# Patient Record
Sex: FEMALE | Race: BLACK OR AFRICAN AMERICAN | HISPANIC OR LATINO | URBAN - METROPOLITAN AREA
[De-identification: names, ages, dates, MRNs, and addresses within clinical notes are randomized per-mention and may not be internally consistent; named-entity substitution may affect disease eponyms.]

---

## 2022-02-01 ENCOUNTER — TELEPHONE (OUTPATIENT)
Dept: OTHER | Facility: OTHER | Age: 22
End: 2022-02-01

## 2022-02-01 NOTE — TELEPHONE ENCOUNTER
Patient called and is asking if the office can give her a call to schedule an new patient appointment

## 2022-02-14 ENCOUNTER — OFFICE VISIT (OUTPATIENT)
Dept: FAMILY MEDICINE CLINIC | Facility: CLINIC | Age: 22
End: 2022-02-14

## 2022-02-14 VITALS
DIASTOLIC BLOOD PRESSURE: 64 MMHG | HEIGHT: 66 IN | OXYGEN SATURATION: 99 % | SYSTOLIC BLOOD PRESSURE: 106 MMHG | BODY MASS INDEX: 21.63 KG/M2 | TEMPERATURE: 97.7 F | RESPIRATION RATE: 16 BRPM | HEART RATE: 92 BPM | WEIGHT: 134.6 LBS

## 2022-02-14 DIAGNOSIS — R22.1 NECK SWELLING: Primary | ICD-10-CM

## 2022-02-14 DIAGNOSIS — Z23 ENCOUNTER FOR IMMUNIZATION: ICD-10-CM

## 2022-02-14 PROCEDURE — 90471 IMMUNIZATION ADMIN: CPT | Performed by: FAMILY MEDICINE

## 2022-02-14 PROCEDURE — 90686 IIV4 VACC NO PRSV 0.5 ML IM: CPT | Performed by: FAMILY MEDICINE

## 2022-02-14 PROCEDURE — 99203 OFFICE O/P NEW LOW 30 MIN: CPT | Performed by: FAMILY MEDICINE

## 2022-02-14 RX ORDER — DESOGESTREL AND ETHINYL ESTRADIOL 0.15-0.03
1 KIT ORAL DAILY
COMMUNITY
Start: 2021-10-06 | End: 2022-10-06

## 2022-02-14 NOTE — ASSESSMENT & PLAN NOTE
-symptoms of neck swelling and left side neck discomfort noticed progressing during the last 7 months  -associated with: hair loss, fatigue, hot flashes  -followed with ENT at [facility]--> laryngoscopy workup remarkable for nasal congestion  -physical exam remarkable for midline neck swelling with some enlargement of thyroid palpated, left side of neck slight more swollen when compared to right but difficult to determine if is more muscle related  -will send thyroid U/s to check for thyroid nodules  -will check lab work: TSH, Free T4, anti TPO antibodies to rule out thyroid disease  -will check U/S neck and soft tissue to rule out any mass vs lymphadenopathy  -upon results will follow up and determine if further workup is needed

## 2022-02-14 NOTE — ASSESSMENT & PLAN NOTE
-due for influenza vaccine, provided today  -discussed with patient that recommendations to wait at least 2 weeks from receiving Flu vaccine to receive COVID vaccine booster

## 2022-02-14 NOTE — PROGRESS NOTES
Assessment/Plan:    Encounter for immunization  -due for influenza vaccine, provided today  -discussed with patient that recommendations to wait at least 2 weeks from receiving Flu vaccine to receive COVID vaccine booster    Neck swelling  -symptoms of neck swelling and left side neck discomfort noticed progressing during the last 7 months  -associated with: hair loss, fatigue, hot flashes  -followed with ENT at [facility]--> laryngoscopy workup remarkable for nasal congestion  -physical exam remarkable for midline neck swelling with some enlargement of thyroid palpated, left side of neck slight more swollen when compared to right but difficult to determine if is more muscle related  -will send thyroid U/s to check for thyroid nodules  -will check lab work: TSH, Free T4, anti TPO antibodies to rule out thyroid disease  -will check U/S neck and soft tissue to rule out any mass vs lymphadenopathy  -upon results will follow up and determine if further workup is needed         Diagnoses and all orders for this visit:    Neck swelling  -     TSH, 3rd generation; Future  -     T4, free; Future  -     US thyroid; Future  -     US head neck soft tissue; Future  -     Anti-TPO Ab; Future    Encounter for immunization  -     influenza vaccine, quadrivalent, 0 5 mL, preservative-free, for adult and pediatric patients 6 mos+ (AFLURIA, FLUARIX, FLULAVAL, FLUZONE)    Other orders  -     desogestrel-ethinyl estradiol (APRI) 0 15-30 MG-MCG per tablet; Take 1 tablet by mouth daily  -     BIOTIN PO; Take 500 mg by mouth          Subjective:      Patient ID: Sandy Kirkpatrick is a 22 y o  female  Case of 21 y/o female who came today to establish care  Patient indicates to have noticed neck discomfort and some difficulty breathing through the nose, specially in the morning  Symptoms have been progressing for the past 7 months approximately    She has been evaluated by ENT in [facility]--> laryngoscopy unremarkable except for nasal congestion  She reports hair loss, fatigue and hot flashes in the morning  Noticed some weight gain after the depo shots  Also some headaches around the time that started hormone contraception, no discomfort with light, frontal headaches location with no pulsating or throbbing  Relieves with ibuprofen PRN  Denies palpitations  OBGYN:  -following at [facility] THE [facility]  -trial with Depo Shot--> stopped due to side effects of headaches and hair loss  -switched to OCP's--> takes only when traveling to see partner, has been off OCP's since about 3 weeks and has had regular periods since  -regular periods, no excess bleeding reported  -A0    Surgeries: scoliosis  Fmhx: No DM, thyroid or HTN  Allergies: none  Meds: ibuprofen PRN, OCP's      The following portions of the patient's history were reviewed and updated as appropriate: allergies, current medications, past family history, past medical history, past social history, past surgical history and problem list     Review of Systems   Constitutional: Positive for fatigue  Negative for fever  HENT:        Neck swelling   Respiratory: Negative for cough, shortness of breath and wheezing  Cardiovascular: Negative for chest pain, palpitations and leg swelling  Gastrointestinal: Negative for abdominal pain  Endocrine: Positive for heat intolerance  Negative for cold intolerance  Skin: Negative  Neurological: Negative for headaches  Psychiatric/Behavioral: The patient is not nervous/anxious  Objective:      /64 (BP Location: Left arm, Patient Position: Sitting, Cuff Size: Standard)   Pulse 92   Temp 97 7 °F (36 5 °C) (Temporal)   Resp 16   Ht 5' 5 5" (1 664 m)   Wt 61 1 kg (134 lb 9 6 oz)   LMP 2022 (Within Days)   SpO2 99%   BMI 22 06 kg/m²          Physical Exam  Vitals reviewed  Constitutional:       General: She is not in acute distress  Appearance: Normal appearance  She is not ill-appearing, toxic-appearing or diaphoretic  Eyes:      Extraocular Movements: Extraocular movements intact  Neck:      Vascular: No carotid bruit  Comments: Palpable thyroid with midline neck swelling bilaterally  Left side slight more enlarged when compared to right side, unclear if related to enlarged neck muscle  variant  Abdominal:      General: Abdomen is flat  Bowel sounds are normal       Palpations: Abdomen is soft  Musculoskeletal:      Cervical back: Normal range of motion  No tenderness  Lymphadenopathy:      Cervical: No cervical adenopathy  Skin:     General: Skin is warm and dry  Coloration: Skin is not jaundiced or pale  Findings: No bruising, erythema, lesion or rash  Comments: Dry hands   Neurological:      General: No focal deficit present  Mental Status: She is alert and oriented to person, place, and time  Mental status is at baseline     Psychiatric:         Mood and Affect: Mood normal

## 2022-02-15 ENCOUNTER — HOSPITAL ENCOUNTER (OUTPATIENT)
Dept: ULTRASOUND IMAGING | Facility: HOSPITAL | Age: 22
Discharge: HOME | End: 2022-02-15
Payer: COMMERCIAL

## 2022-02-15 DIAGNOSIS — R22.1 NECK SWELLING: ICD-10-CM

## 2022-02-15 DIAGNOSIS — E04.1 THYROID NODULE: ICD-10-CM

## 2022-02-15 PROCEDURE — 76536 US EXAM OF HEAD AND NECK: CPT

## 2022-03-31 ENCOUNTER — APPOINTMENT (OUTPATIENT)
Dept: LAB | Facility: CLINIC | Age: 22
End: 2022-03-31
Payer: COMMERCIAL

## 2022-03-31 ENCOUNTER — OFFICE VISIT (OUTPATIENT)
Dept: FAMILY MEDICINE CLINIC | Facility: CLINIC | Age: 22
End: 2022-03-31

## 2022-03-31 VITALS
DIASTOLIC BLOOD PRESSURE: 62 MMHG | SYSTOLIC BLOOD PRESSURE: 100 MMHG | HEART RATE: 79 BPM | RESPIRATION RATE: 16 BRPM | WEIGHT: 132.2 LBS | TEMPERATURE: 98 F | HEIGHT: 66 IN | OXYGEN SATURATION: 99 % | BODY MASS INDEX: 21.24 KG/M2

## 2022-03-31 DIAGNOSIS — L70.8 OTHER ACNE: Primary | ICD-10-CM

## 2022-03-31 DIAGNOSIS — R23.8 BRUISES EASILY: ICD-10-CM

## 2022-03-31 DIAGNOSIS — R22.1 NECK SWELLING: ICD-10-CM

## 2022-03-31 DIAGNOSIS — Z30.49 ENCOUNTER FOR SURVEILLANCE OF OTHER CONTRACEPTIVE: ICD-10-CM

## 2022-03-31 PROBLEM — R23.3 BRUISES EASILY: Status: ACTIVE | Noted: 2022-03-31

## 2022-03-31 PROBLEM — Z30.40 CONTRACEPTIVE SURVEILLANCE: Status: ACTIVE | Noted: 2022-03-31

## 2022-03-31 LAB
BASOPHILS # BLD AUTO: 0.04 THOUSANDS/ΜL (ref 0–0.1)
BASOPHILS NFR BLD AUTO: 1 % (ref 0–1)
EOSINOPHIL # BLD AUTO: 0.04 THOUSAND/ΜL (ref 0–0.61)
EOSINOPHIL NFR BLD AUTO: 1 % (ref 0–6)
ERYTHROCYTE [DISTWIDTH] IN BLOOD BY AUTOMATED COUNT: 13.3 % (ref 11.6–15.1)
HCT VFR BLD AUTO: 38.1 % (ref 34.8–46.1)
HGB BLD-MCNC: 12.2 G/DL (ref 11.5–15.4)
IMM GRANULOCYTES # BLD AUTO: 0.01 THOUSAND/UL (ref 0–0.2)
IMM GRANULOCYTES NFR BLD AUTO: 0 % (ref 0–2)
LYMPHOCYTES # BLD AUTO: 1.37 THOUSANDS/ΜL (ref 0.6–4.47)
LYMPHOCYTES NFR BLD AUTO: 38 % (ref 14–44)
MCH RBC QN AUTO: 28.6 PG (ref 26.8–34.3)
MCHC RBC AUTO-ENTMCNC: 32 G/DL (ref 31.4–37.4)
MCV RBC AUTO: 89 FL (ref 82–98)
MONOCYTES # BLD AUTO: 0.38 THOUSAND/ΜL (ref 0.17–1.22)
MONOCYTES NFR BLD AUTO: 11 % (ref 4–12)
NEUTROPHILS # BLD AUTO: 1.74 THOUSANDS/ΜL (ref 1.85–7.62)
NEUTS SEG NFR BLD AUTO: 49 % (ref 43–75)
NRBC BLD AUTO-RTO: 0 /100 WBCS
PLATELET # BLD AUTO: 319 THOUSANDS/UL (ref 149–390)
PMV BLD AUTO: 11.4 FL (ref 8.9–12.7)
RBC # BLD AUTO: 4.26 MILLION/UL (ref 3.81–5.12)
T4 FREE SERPL-MCNC: 0.99 NG/DL (ref 0.78–1.33)
TSH SERPL DL<=0.05 MIU/L-ACNC: 1.38 UIU/ML (ref 0.46–3.98)
WBC # BLD AUTO: 3.58 THOUSAND/UL (ref 4.31–10.16)

## 2022-03-31 PROCEDURE — 86376 MICROSOMAL ANTIBODY EACH: CPT

## 2022-03-31 PROCEDURE — 99213 OFFICE O/P EST LOW 20 MIN: CPT | Performed by: FAMILY MEDICINE

## 2022-03-31 PROCEDURE — 85025 COMPLETE CBC W/AUTO DIFF WBC: CPT

## 2022-03-31 PROCEDURE — 84439 ASSAY OF FREE THYROXINE: CPT

## 2022-03-31 PROCEDURE — 84443 ASSAY THYROID STIM HORMONE: CPT

## 2022-03-31 PROCEDURE — 36415 COLL VENOUS BLD VENIPUNCTURE: CPT

## 2022-03-31 NOTE — ASSESSMENT & PLAN NOTE
-thyroid ultrasound overall normal, no nodules  -physical exam: no palpable thyroid nodules or lymphadenopathy noted  -reports frequent hair loss; associated at times with concurrent use of OCP's  -due for lab work: TSH/ FT4 for assess

## 2022-03-31 NOTE — PROGRESS NOTES
Assessment/Plan:    Neck swelling  -thyroid ultrasound overall normal, no nodules  -physical exam: no palpable thyroid nodules or lymphadenopathy noted  -reports frequent hair loss; associated at times with concurrent use of OCP's  -due for lab work: TSH/ FT4 for assess    Other acne  -on/off changes associated with use of OCP's  -no prior hx of acne in the past  -physical exam: small comedones in area of cheeks bilaterally, no erythema  -recommendations to start: benzoyl peroxide daily; wash face prior to apply  -re-evaluate in next visit, if persists or worsens consider adding topical antibiotic to regimen  -use moisturizer with sunscreen as well    Bruises easily  -reports symptoms of bruising on/off since use of OCP's  -no bruising changes noted today  -check CBC    Contraceptive surveillance  -follows with OBGYN at [address] [place] [address] [address]  -prior use of Depo Porvera injection; now switched to oral OCP's  -sexually active in long distance relationship; uses OCP's for short period of time  -noticed side effects of : acne, hair loss  -followed with dermatology: trial of rogaine for hair loss not worked as per patient  -discussed with patient that the hormone composition of Depo Provera injection and OCP's varies and hormonal variations varies from patient to patient  -will check labs for thyroid, CBC  -recommendations to follow up with OBGYN to explain about side effects to see which other contraceptive options could be recommended  -ED precautions if sudden onset chest pain, SOB, leg swelling while using OCP' s due to potential side effects of DVT       Diagnoses and all orders for this visit:    Other acne  -     benzoyl peroxide 5 % gel; Apply topically daily    Bruises easily  -     CBC and differential; Future    Neck swelling    Encounter for surveillance of other contraceptive          Subjective:      Patient ID: Sandy Kirkpatrick is a 22 y o  female  Case of 21 y/o female who came today for follow up   She indicates that had thyroid ultrasound done, has not noticed recently the 'lump' in the neck that had prior  She reports to have noticed some more increased hair loss and acne in the face since switching to oral OCP's  She follows with OBGYN, and follows with them when she needs to start the contraceptives  She is in long distance relationship and starts OCP's when going to visit partner  Last use of OCP's from October to January, 2022  Afterwards, her periods have been normal , some slight more cramps than usual  Has also noticed some sporadic hematomas at times, that then heal on its own  Denies chest pain, SOB, leg swelling or tenderness  The following portions of the patient's history were reviewed and updated as appropriate: allergies, current medications, past family history, past medical history, past social history, past surgical history and problem list     Review of Systems   Constitutional: Negative for fever  HENT:        Acne   Respiratory: Negative for cough, shortness of breath and wheezing  Cardiovascular: Negative for chest pain, palpitations and leg swelling  Gastrointestinal: Negative for abdominal pain  Endocrine:        Hair loss   Skin: Negative  Bruises easily   Psychiatric/Behavioral: The patient is not nervous/anxious  Objective:      /62 (BP Location: Left arm, Patient Position: Sitting, Cuff Size: Standard)   Pulse 79   Temp 98 °F (36 7 °C) (Temporal)   Resp 16   Ht 5' 5 5" (1 664 m)   Wt 60 kg (132 lb 3 2 oz)   LMP 02/28/2022   SpO2 99%   BMI 21 66 kg/m²          Physical Exam  Vitals reviewed  Constitutional:       General: She is not in acute distress  Appearance: Normal appearance  She is not ill-appearing, toxic-appearing or diaphoretic  HENT:      Head:      Comments: Slight hair loss pattern in right upper side of head, no visible hair loss patches pattern noted  Eyes:      Extraocular Movements: Extraocular movements intact     Neck: Comments: No palpable thyroid nodules or lymphadenopathy  Cardiovascular:      Rate and Rhythm: Normal rate and regular rhythm  Pulses: Normal pulses  Heart sounds: Normal heart sounds  No murmur heard  Pulmonary:      Effort: Pulmonary effort is normal  No respiratory distress  Breath sounds: Normal breath sounds  No wheezing  Abdominal:      General: Abdomen is flat  Bowel sounds are normal  There is no distension  Tenderness: There is no abdominal tenderness  Musculoskeletal:         General: Normal range of motion  Cervical back: Normal range of motion  Lymphadenopathy:      Cervical: No cervical adenopathy  Skin:     General: Skin is warm  Comments: No bruises or hematomas visible today   Neurological:      Mental Status: She is alert  Mental status is at baseline     Psychiatric:         Mood and Affect: Mood normal

## 2022-03-31 NOTE — ASSESSMENT & PLAN NOTE
-follows with OBGYN at [facility] AT THE [facility]  -prior use of Depo Porvera injection; now switched to oral OCP's  -sexually active in long distance relationship; uses OCP's for short period of time  -noticed side effects of : acne, hair loss  -followed with dermatology: trial of rogaine for hair loss not worked as per patient  -discussed with patient that the hormone composition of Depo Provera injection and OCP's varies and hormonal variations varies from patient to patient  -will check labs for thyroid, CBC  -recommendations to follow up with OBGYN to explain about side effects to see which other contraceptive options could be recommended  -ED precautions if sudden onset chest pain, SOB, leg swelling while using OCP' s due to potential side effects of DVT

## 2022-03-31 NOTE — ASSESSMENT & PLAN NOTE
-on/off changes associated with use of OCP's  -no prior hx of acne in the past  -physical exam: small comedones in area of cheeks bilaterally, no erythema  -recommendations to start: benzoyl peroxide daily; wash face prior to apply  -re-evaluate in next visit, if persists or worsens consider adding topical antibiotic to regimen  -use moisturizer with sunscreen as well

## 2022-03-31 NOTE — ASSESSMENT & PLAN NOTE
-reports symptoms of bruising on/off since use of OCP's  -no bruising changes noted today  -check CBC

## 2022-04-01 LAB — THYROPEROXIDASE AB SERPL-ACNC: 10 IU/ML (ref 0–34)

## 2022-04-25 ENCOUNTER — OFFICE VISIT (OUTPATIENT)
Dept: OBGYN CLINIC | Facility: CLINIC | Age: 22
End: 2022-04-25

## 2022-04-25 VITALS
SYSTOLIC BLOOD PRESSURE: 97 MMHG | BODY MASS INDEX: 21.05 KG/M2 | WEIGHT: 131 LBS | HEART RATE: 87 BPM | DIASTOLIC BLOOD PRESSURE: 62 MMHG | HEIGHT: 66 IN

## 2022-04-25 DIAGNOSIS — Z30.011 ENCOUNTER FOR INITIAL PRESCRIPTION OF CONTRACEPTIVE PILLS: Primary | ICD-10-CM

## 2022-04-25 DIAGNOSIS — Z30.09 GENERAL COUNSELING AND ADVICE ON FEMALE CONTRACEPTION: ICD-10-CM

## 2022-04-25 PROCEDURE — 99213 OFFICE O/P EST LOW 20 MIN: CPT | Performed by: NURSE PRACTITIONER

## 2022-04-25 RX ORDER — NORGESTIMATE AND ETHINYL ESTRADIOL 0.25-0.035
1 KIT ORAL DAILY
Qty: 28 TABLET | Refills: 3 | Status: SHIPPED | OUTPATIENT
Start: 2022-04-25

## 2022-04-25 NOTE — PROGRESS NOTES
Assessment/Plan:         Diagnoses and all orders for this visit:    Encounter for initial prescription of contraceptive pills  -     norgestimate-ethinyl estradiol (Sprintec 28) 0 25-35 MG-MCG per tablet; Take 1 tablet by mouth daily    General counseling and advice on female contraception      Plan  Start birth control pills the Sunday of your next period  R$eturn in 3 months to follow up birth control pill start and annual GYN exam and PAP  Call with needs or concerns   Pt verbalized understanding of all discussed  Subjective:      Patient ID: Gilberto Trejo is a 22 y o  female  HPI   Pt presents to re-start OCP's   Pt states she was on OCP's until January and then she stopped OCP's because she was not sexually active  Pt states she is going back to her country to see her spouse and would like to start OCP's  Pt states previous OCP's were ordered by [facility]    Safe and effective use of OCP's provided  Explained she should consistently use OCP's for birth control, not stop and start when she goes back and forth from her country to keep periods regular and have the best chance of preventing pregnancy    The following portions of the patient's history were reviewed and updated as appropriate: allergies, current medications, past family history, past medical history, past social history, past surgical history and problem list     Review of Systems      Pertinent items are note in the HPI    Objective:      BP 97/62   Pulse 87   Ht 5' 6" (1 676 m)   Wt 59 4 kg (131 lb)   LMP 04/02/2022   BMI 21 14 kg/m²          Physical Exam  Vitals reviewed  Constitutional:       Appearance: Normal appearance  Eyes:      General:         Right eye: No discharge  Left eye: No discharge  Pulmonary:      Effort: Pulmonary effort is normal  No respiratory distress  Musculoskeletal:         General: Normal range of motion  Cervical back: Normal range of motion     Neurological:      Mental Status: She is alert and oriented to person, place, and time  Psychiatric:         Mood and Affect: Mood normal          Behavior: Behavior normal          Thought Content:  Thought content normal        Negative cough or SOB

## 2022-04-25 NOTE — PATIENT INSTRUCTIONS
Start birth control pills the Sunday of your next period  R$eturn in 3 months to follow up birth control pill start and annual GYN exam and PAP  Call with needs or concerns     Birth Control Pills   WHAT YOU NEED TO KNOW:   Birth control pills are also called oral contraceptives, or the pill  It is medicine that helps prevent pregnancy  Birth control pills work by preventing ovulation  Ovulation is when the ovaries make and release an egg cell each month  If this egg gets fertilized by sperm, pregnancy occurs  Birth control pills may also help to prevent pregnancy by keeping sperm from fertilizing an egg  DISCHARGE INSTRUCTIONS:   Follow up with your healthcare provider as directed:  Write down your questions so you remember to ask them during your visits  Advantages of birth control pills:  When birth control pills are used correctly, the chances of getting pregnant are very low  Birth control pills may help decrease bleeding and pain during your monthly period  They may also help prevent cancer of the uterus and ovaries  Disadvantages of birth control pills: You may have sudden changes in your mood or feelings while you take birth control pills  You may have nausea and decreased sex drive  You may have an increased appetite and rapid weight gain  You may also have bleeding in between periods, less frequent periods, vaginal dryness, and breast pain  Birth control pills will not protect you from sexually transmitted infections  Rarely, some birth control pills can increase your risk for a blood clot  This may become life-threatening  If you want to get pregnant: If you are planning to have a baby, ask your healthcare provider when you may stop taking your birth control pills  It may take some time for you to start ovulating again  Ask your healthcare provider for more information about pregnancy after birth control pills  When to start taking birth control pills after you have a baby:   If you are not breastfeeding, you may start taking birth control pills 3 weeks after you give birth  You may be able to take certain types of birth control pills if you are breastfeeding  These pills can be started from 6 weeks to 6 months after you give birth  Ask your healthcare provider for more information about when to start taking birth control pills after you give birth  Contact your healthcare provider if:   · You have forgotten to take a birth control pill  · You have mood changes, such as depression, since starting birth control pills  · You have nausea or you are vomiting  · You have severe abdominal pain  · You missed a period and have questions or concerns about being pregnant  · You still have bleeding 4 months after taking birth control pills correctly  · You have questions or concerns about your condition or care  Return to the emergency department if:   · Your arm or leg feels warm, tender, and painful  It may look swollen and red  · You feel lightheaded, short of breath, and have chest pain  · You cough up blood  · You have any of the following signs of a stroke:      ¨ Numbness or drooping on one side of your face     ¨ Weakness in an arm or leg    ¨ Confusion or difficulty speaking    ¨ Dizziness, a severe headache, or vision loss    · You have severe pain, numbness, or swelling in your arms or legs  © 2017 [facility] Information is for End User's use only and may not be sold, redistributed or otherwise used for commercial purposes  All illustrations and images included in CareNotes® are the copyrighted property of A D A M , Inc  or Gunnar Pinedo  The above information is an  only  It is not intended as medical advice for individual conditions or treatments  Talk to your doctor, nurse or pharmacist before following any medical regimen to see if it is safe and effective for you  Missed or Late Pills:  For combined (Estrogen and Progestin) pills:    If one hormonal pill is LATE (<24 hours since a pill should have been taken): Take the late or missed pill as soon as possible  Continue taking the remaining pills at the usual time (even if it means taking two pills on the same day)  No additional contraceptive protection is needed  Emergency contraception is not usually needed but can be considered if hormonal pills were missed earlier in the cycle or in the last week of the previous cycle  If one hormonal pill has been MISSED (24 to <48 hours since a pill should have been taken): Take the late or missed pill as soon as possible  Continue taking the remaining pills at the usual time (even if it means taking two pills on the same day)  No additional contraceptive protection is needed  Emergency contraception is not usually needed but can be considered if hormonal pills were missed earlier in the cycle or in the last week of the previous cycle  If two or more consecutive hormonal pills have been missed: (less than or equal to 48 hours since a pill should have been taken): Take the most recent missed pill as soon as possible  (Any other missed pills should be discarded ) Continue taking the remaining pills at the usual time (even if it means taking two pills on the same day)  Use back-up contraception (e g , condoms) or avoid sexual intercourse until hormonal pills have been taken for 7 consecutive days  If pills were missed in the last week of hormonal pills (e g , days 15-21 for 28-day pill packs): Omit the hormone-free interval by finishing the horomal pills in the current pack and starting a new pack the next day  If unable to start a new pack immediately, use back-up contraception (e g , condoms) or avoid sexual intercourse until hormonal pills from a new pack have been taken for 7 consecutive days   Emergency contraception should be considered if hormonal pills were missed during the first week and unprotected sexual intercourse occurred in the previous 5 days  Emergency contraception may also be considered at other times as appropriate  Source: [place]  Selected Practice Recommendations for Contraceptive Use, 2013  COVID-19 Instructions    If you are having any of the following:  Cough   Shortness of breath   Fever  If traveled within past 2 weeks internationally or to high risk [place] states  Or been in contact with someone that has     Please call either:   Your PCP office  [phone], option [phone]    They will screen you over the phone and direct you to the nearest appropriate testing location  DO NOT go to your PCP or OB office without calling first     WARNING SIGNS DURING PREGNANCY  Call our office at [phone] for any of the followin  Vaginal bleeding  2  Sharp abdominal pain that does not go away  3  Fever (more than 100 4 and is not relieved by Tylenol)  4  Persistent vomiting lasting greater than 24 hours  5  Chest pain   6  Pain or burning when you urinate  7  Severe headache that doesn't resolve with Tylenol  8  Blurred vision or seeing spots in your vision  9  Sudden swelling of your face or hands  10  Redness, swelling or pain in a leg  11  A sudden weight gain in just a few days  12  Decrease in your baby's movement (after 28 weeks or the 6th month of pregnancy)  13  A loss of watery fluid from your vagina - can be a gush, a trickle or continuous wetness  14   After 20 weeks of pregnancy, rhythmic cramping (greater than 4 per hour) or menstrual like low/pelvic pain

## 2022-08-13 DIAGNOSIS — Z30.011 ENCOUNTER FOR INITIAL PRESCRIPTION OF CONTRACEPTIVE PILLS: ICD-10-CM

## 2022-08-15 ENCOUNTER — TELEPHONE (OUTPATIENT)
Dept: OBGYN CLINIC | Facility: CLINIC | Age: 22
End: 2022-08-15

## 2022-08-15 RX ORDER — NORGESTIMATE AND ETHINYL ESTRADIOL 0.25-0.035
1 KIT ORAL DAILY
Qty: 28 TABLET | Refills: 0 | Status: SHIPPED | OUTPATIENT
Start: 2022-08-15 | End: 2022-08-29 | Stop reason: SDUPTHER

## 2022-08-22 ENCOUNTER — TELEPHONE (OUTPATIENT)
Dept: OBGYN CLINIC | Facility: CLINIC | Age: 22
End: 2022-08-22

## 2022-08-29 ENCOUNTER — TELEPHONE (OUTPATIENT)
Dept: OBGYN CLINIC | Facility: CLINIC | Age: 22
End: 2022-08-29

## 2022-08-29 DIAGNOSIS — Z30.011 ENCOUNTER FOR INITIAL PRESCRIPTION OF CONTRACEPTIVE PILLS: ICD-10-CM

## 2022-08-29 RX ORDER — NORGESTIMATE AND ETHINYL ESTRADIOL 0.25-0.035
1 KIT ORAL DAILY
Qty: 28 TABLET | Refills: 0 | Status: SHIPPED | OUTPATIENT
Start: 2022-08-29 | End: 2022-09-06 | Stop reason: SDUPTHER

## 2022-09-06 ENCOUNTER — ANNUAL EXAM (OUTPATIENT)
Dept: OBGYN CLINIC | Facility: CLINIC | Age: 22
End: 2022-09-06

## 2022-09-06 VITALS
HEIGHT: 66 IN | WEIGHT: 135.6 LBS | SYSTOLIC BLOOD PRESSURE: 103 MMHG | HEART RATE: 66 BPM | BODY MASS INDEX: 21.79 KG/M2 | DIASTOLIC BLOOD PRESSURE: 67 MMHG

## 2022-09-06 DIAGNOSIS — Z11.3 SCREEN FOR STD (SEXUALLY TRANSMITTED DISEASE): ICD-10-CM

## 2022-09-06 DIAGNOSIS — Z01.419 ENCOUNTER FOR ANNUAL ROUTINE GYNECOLOGICAL EXAMINATION: Primary | ICD-10-CM

## 2022-09-06 DIAGNOSIS — Z30.011 ENCOUNTER FOR INITIAL PRESCRIPTION OF CONTRACEPTIVE PILLS: ICD-10-CM

## 2022-09-06 PROCEDURE — G0145 SCR C/V CYTO,THINLAYER,RESCR: HCPCS | Performed by: NURSE PRACTITIONER

## 2022-09-06 PROCEDURE — 99395 PREV VISIT EST AGE 18-39: CPT | Performed by: NURSE PRACTITIONER

## 2022-09-06 PROCEDURE — 87591 N.GONORRHOEAE DNA AMP PROB: CPT | Performed by: NURSE PRACTITIONER

## 2022-09-06 PROCEDURE — 87491 CHLMYD TRACH DNA AMP PROBE: CPT | Performed by: NURSE PRACTITIONER

## 2022-09-06 RX ORDER — NORGESTIMATE AND ETHINYL ESTRADIOL 0.25-0.035
1 KIT ORAL DAILY
Qty: 28 TABLET | Refills: 11 | Status: SHIPPED | OUTPATIENT
Start: 2022-09-06

## 2022-09-06 NOTE — PATIENT INSTRUCTIONS
Continue birth control pills as previously directed  PAP and STD results can take up to 2 weeks  Call with needs or concerns  Return in 1 year    Birth Control Pills   WHAT YOU NEED TO KNOW:   Birth control pills are also called oral contraceptives, or the pill  It is medicine that helps prevent pregnancy  Birth control pills work by preventing ovulation  Ovulation is when the ovaries make and release an egg cell each month  If this egg gets fertilized by sperm, pregnancy occurs  Birth control pills may also help to prevent pregnancy by keeping sperm from fertilizing an egg  DISCHARGE INSTRUCTIONS:   Follow up with your healthcare provider as directed:  Write down your questions so you remember to ask them during your visits  Advantages of birth control pills:  When birth control pills are used correctly, the chances of getting pregnant are very low  Birth control pills may help decrease bleeding and pain during your monthly period  They may also help prevent cancer of the uterus and ovaries  Disadvantages of birth control pills: You may have sudden changes in your mood or feelings while you take birth control pills  You may have nausea and decreased sex drive  You may have an increased appetite and rapid weight gain  You may also have bleeding in between periods, less frequent periods, vaginal dryness, and breast pain  Birth control pills will not protect you from sexually transmitted infections  Rarely, some birth control pills can increase your risk for a blood clot  This may become life-threatening  If you want to get pregnant: If you are planning to have a baby, ask your healthcare provider when you may stop taking your birth control pills  It may take some time for you to start ovulating again  Ask your healthcare provider for more information about pregnancy after birth control pills  When to start taking birth control pills after you have a baby:   If you are not breastfeeding, you may start taking birth control pills 3 weeks after you give birth  You may be able to take certain types of birth control pills if you are breastfeeding  These pills can be started from 6 weeks to 6 months after you give birth  Ask your healthcare provider for more information about when to start taking birth control pills after you give birth  Contact your healthcare provider if:   You have forgotten to take a birth control pill  You have mood changes, such as depression, since starting birth control pills  You have nausea or you are vomiting  You have severe abdominal pain  You missed a period and have questions or concerns about being pregnant  You still have bleeding 4 months after taking birth control pills correctly  You have questions or concerns about your condition or care  Return to the emergency department if:   Your arm or leg feels warm, tender, and painful  It may look swollen and red  You feel lightheaded, short of breath, and have chest pain  You cough up blood  You have any of the following signs of a stroke:      Numbness or drooping on one side of your face     Weakness in an arm or leg    Confusion or difficulty speaking    Dizziness, a severe headache, or vision loss    You have severe pain, numbness, or swelling in your arms or legs  © 2017 [facility] Information is for End User's use only and may not be sold, redistributed or otherwise used for commercial purposes  All illustrations and images included in CareNotes® are the copyrighted property of [employer] , [employer]  or Gunnar Pinedo  The above information is an  only  It is not intended as medical advice for individual conditions or treatments  Talk to your doctor, nurse or pharmacist before following any medical regimen to see if it is safe and effective for you  Missed or Late Pills:  For combined (Estrogen and Progestin) pills:    If one hormonal pill is LATE (<24 hours since a pill should have been taken): Take the late or missed pill as soon as possible  Continue taking the remaining pills at the usual time (even if it means taking two pills on the same day)  No additional contraceptive protection is needed  Emergency contraception is not usually needed but can be considered if hormonal pills were missed earlier in the cycle or in the last week of the previous cycle  If one hormonal pill has been MISSED (24 to <48 hours since a pill should have been taken): Take the late or missed pill as soon as possible  Continue taking the remaining pills at the usual time (even if it means taking two pills on the same day)  No additional contraceptive protection is needed  Emergency contraception is not usually needed but can be considered if hormonal pills were missed earlier in the cycle or in the last week of the previous cycle  If two or more consecutive hormonal pills have been missed: (less than or equal to 48 hours since a pill should have been taken): Take the most recent missed pill as soon as possible  (Any other missed pills should be discarded ) Continue taking the remaining pills at the usual time (even if it means taking two pills on the same day)  Use back-up contraception (e g , condoms) or avoid sexual intercourse until hormonal pills have been taken for 7 consecutive days  If pills were missed in the last week of hormonal pills (e g , days 15-21 for 28-day pill packs): Omit the hormone-free interval by finishing the horomal pills in the current pack and starting a new pack the next day  If unable to start a new pack immediately, use back-up contraception (e g , condoms) or avoid sexual intercourse until hormonal pills from a new pack have been taken for 7 consecutive days  Emergency contraception should be considered if hormonal pills were missed during the first week and unprotected sexual intercourse occurred in the previous 5 days   Emergency contraception may also be considered at other times as appropriate  Source: [place]  Selected Practice Recommendations for Contraceptive Use, 2013

## 2022-09-06 NOTE — PROGRESS NOTES
Annual Exam    Assessment   1  Encounter for annual routine gynecological examination  Liquid-based pap, screening   2  Screen for STD (sexually transmitted disease)  Chlamydia/GC amplified DNA by PCR   3  Encounter for initial prescription of contraceptive pills  norgestimate-ethinyl estradiol (Sprintec 28) 0 25-35 MG-MCG per tablet     well woman       Plan       All questions answered  Breast self exam technique reviewed and patient encouraged to perform self-exam monthly  Chlamydia specimen  Contraception: OCP (estrogen/progesterone)  Discussed healthy lifestyle modifications  Follow up in 1 year  GC specimen  Thin prep Pap smear  Patient Instructions   Continue birth control pills as previously directed  PAP and STD results can take up to 2 weeks  Call with needs or concerns  Return in 1 year  Pt verbalized understanding of all discussed  Subjective      Pete Mckeon is a 25 y o  No obstetric history on file  female who presents for annual well woman exam  Periods are regular every 28-30 days, lasting 5 days  No intermenstrual bleeding, spotting, or discharge  Pt states she is  x 5 years, denies domestic violence partner x 4 hours    Depression Screening Follow-up Plan: Patient's depression screening was positive with a PHQ-2 score of 1  Their PHQ-9 score was 1  Clinically patient does not have depression  No treatment is required          Current contraception: OCP (estrogen/progesterone)  History of abnormal Pap smear: First today  Family history of uterine or ovarian cancer: no  Regular self breast exam: yes  History of abnormal mammogram: N/A  Family history of breast cancer: no  History of abnormal lipids: unknown  Menstrual History:  OB History        0    Para   0    Term   0       0    AB   0    Living   0       SAB   0    IAB   0    Ectopic   0    Multiple   0    Live Births   0                Menarche age: 16  Patient's last menstrual period was 08/15/2022  The following portions of the patient's history were reviewed and updated as appropriate: allergies, current medications, past family history, past medical history, past social history, past surgical history and problem list     Review of Systems  Pertinent items are noted in HPI        Objective      /67   Pulse 66   Ht 5' 6" (1 676 m)   Wt 61 5 kg (135 lb 9 6 oz)   LMP 08/15/2022   BMI 21 89 kg/m²     General: alert and oriented, in no acute distress, alert, appears stated age and cooperative   Heart: regular rate and rhythm, S1, S2 normal, no murmur, click, rub or gallop   Lungs: clear to auscultation bilaterally, WNL respiratory effort, negative cough or SOB   Thyroid: Negative masses palpable   Abdomen: soft, non-tender, without masses or organomegaly   Vulva: normal   Vagina: normal mucosa   Cervix: no bleeding following Pap, no cervical motion tenderness and no lesions   Uterus: normal size, non-tender, normal shape and consistency   Adnexa: normal adnexa   Urethra: normal   Breasts: NT,negative masses, discharge, or dimpling

## 2022-09-06 NOTE — LETTER
2022    [address] [id number]          2022    To Brant Cruz  : 2000      This letter is to advise you that your recent PAP SMEAR results were reviewed by me and are NORMAL    We will see you in 1 year for your annual exam     Jim Monteiro

## 2022-09-07 LAB
C TRACH DNA SPEC QL NAA+PROBE: NEGATIVE
N GONORRHOEA DNA SPEC QL NAA+PROBE: NEGATIVE

## 2022-09-08 LAB
LAB AP GYN PRIMARY INTERPRETATION: NORMAL
Lab: NORMAL
PATH INTERP SPEC-IMP: NORMAL

## 2023-06-26 ENCOUNTER — OFFICE VISIT (OUTPATIENT)
Dept: OBGYN CLINIC | Facility: CLINIC | Age: 23
End: 2023-06-26

## 2023-06-26 VITALS
BODY MASS INDEX: 21.63 KG/M2 | HEIGHT: 66 IN | WEIGHT: 134.6 LBS | SYSTOLIC BLOOD PRESSURE: 111 MMHG | HEART RATE: 61 BPM | DIASTOLIC BLOOD PRESSURE: 71 MMHG

## 2023-06-26 DIAGNOSIS — Z30.09 UNWANTED FERTILITY: Primary | ICD-10-CM

## 2023-06-26 PROBLEM — Z23 ENCOUNTER FOR IMMUNIZATION: Status: RESOLVED | Noted: 2022-02-14 | Resolved: 2023-06-26

## 2023-06-26 PROBLEM — R22.1 NECK SWELLING: Status: RESOLVED | Noted: 2022-02-14 | Resolved: 2023-06-26

## 2023-06-26 PROBLEM — R23.3 BRUISES EASILY: Status: RESOLVED | Noted: 2022-03-31 | Resolved: 2023-06-26

## 2023-06-26 PROBLEM — L70.8 OTHER ACNE: Status: RESOLVED | Noted: 2022-03-31 | Resolved: 2023-06-26

## 2023-06-26 PROBLEM — Z30.40 CONTRACEPTIVE SURVEILLANCE: Status: RESOLVED | Noted: 2022-03-31 | Resolved: 2023-06-26

## 2023-06-26 PROCEDURE — 99212 OFFICE O/P EST SF 10 MIN: CPT | Performed by: NURSE PRACTITIONER

## 2023-06-26 RX ORDER — NORGESTIMATE AND ETHINYL ESTRADIOL 0.25-0.035
1 KIT ORAL DAILY
Qty: 28 TABLET | Refills: 4 | Status: SHIPPED | OUTPATIENT
Start: 2023-06-26

## 2023-06-26 NOTE — PROGRESS NOTES
Vanessa Bamberger presents today requesting refills of her OCP's  She has been using [facility] for the past 2 years without any issues  Given Rx refills  Return as previously scheduled for annual GYN exam in 9/2023

## 2023-06-26 NOTE — PATIENT INSTRUCTIONS
Sailaja por marquis confianza en nuestro equipo  Elaine B[place] y agradecemos tereso comentarios  Si recibe dwayne encuesta nuestra, tómese unos momentos para informarnos cómo estamos     Sinceramente,  [id number]

## 2023-09-07 ENCOUNTER — TELEPHONE (OUTPATIENT)
Dept: OBGYN CLINIC | Facility: CLINIC | Age: 23
End: 2023-09-07

## 2023-10-31 ENCOUNTER — TELEPHONE (OUTPATIENT)
Dept: OBGYN CLINIC | Facility: CLINIC | Age: 23
End: 2023-10-31

## 2024-02-26 ENCOUNTER — HOSPITAL ENCOUNTER (OUTPATIENT)
Dept: RADIOLOGY | Facility: HOSPITAL | Age: 24
Discharge: HOME | End: 2024-02-26
Attending: ORTHOPAEDIC SURGERY
Payer: COMMERCIAL

## 2024-02-26 ENCOUNTER — OFFICE VISIT (OUTPATIENT)
Dept: OBGYN CLINIC | Facility: HOSPITAL | Age: 24
End: 2024-02-26
Payer: COMMERCIAL

## 2024-02-26 ENCOUNTER — OFFICE VISIT (OUTPATIENT)
Dept: OBGYN CLINIC | Facility: CLINIC | Age: 24
End: 2024-02-26

## 2024-02-26 VITALS
HEIGHT: 66 IN | DIASTOLIC BLOOD PRESSURE: 81 MMHG | WEIGHT: 139.33 LBS | BODY MASS INDEX: 22.39 KG/M2 | SYSTOLIC BLOOD PRESSURE: 108 MMHG | HEART RATE: 97 BPM

## 2024-02-26 VITALS
SYSTOLIC BLOOD PRESSURE: 120 MMHG | WEIGHT: 139.4 LBS | HEIGHT: 66 IN | DIASTOLIC BLOOD PRESSURE: 74 MMHG | HEART RATE: 80 BPM | BODY MASS INDEX: 22.4 KG/M2

## 2024-02-26 DIAGNOSIS — Z30.41 ENCOUNTER FOR SURVEILLANCE OF CONTRACEPTIVE PILLS: Primary | ICD-10-CM

## 2024-02-26 DIAGNOSIS — R52 PAIN: Primary | ICD-10-CM

## 2024-02-26 DIAGNOSIS — Z30.09 UNWANTED FERTILITY: ICD-10-CM

## 2024-02-26 DIAGNOSIS — M54.6 CHRONIC BILATERAL THORACIC BACK PAIN: ICD-10-CM

## 2024-02-26 DIAGNOSIS — G89.29 CHRONIC BILATERAL THORACIC BACK PAIN: ICD-10-CM

## 2024-02-26 DIAGNOSIS — R52 PAIN: ICD-10-CM

## 2024-02-26 PROCEDURE — 99213 OFFICE O/P EST LOW 20 MIN: CPT | Performed by: NURSE PRACTITIONER

## 2024-02-26 PROCEDURE — 72082 X-RAY EXAM ENTIRE SPI 2/3 VW: CPT

## 2024-02-26 PROCEDURE — 99204 OFFICE O/P NEW MOD 45 MIN: CPT | Performed by: ORTHOPAEDIC SURGERY

## 2024-02-26 RX ORDER — NORGESTIMATE AND ETHINYL ESTRADIOL 0.25-0.035
1 KIT ORAL DAILY
Qty: 28 TABLET | Refills: 12 | Status: SHIPPED | OUTPATIENT
Start: 2024-02-26

## 2024-02-26 RX ORDER — METHOCARBAMOL 500 MG/1
500 TABLET, FILM COATED ORAL 4 TIMES DAILY PRN
Qty: 56 TABLET | Refills: 0 | Status: SHIPPED | OUTPATIENT
Start: 2024-02-26 | End: 2024-03-11

## 2024-02-26 NOTE — PROGRESS NOTES
Assessment & Plan/Medical Decision Makin y.o. female with PMH of scoliosis s/p posterior instrumented fusion with periscapular pain        Fortunately patient remains neurologically intact and functional. Physical exam showing no weakness or pain.   We discussed the treatment options including physical therapy, at home exercises, activity modifications, chiropractic medicine, oral medications, interventional spine procedures.  At this time recommend continued conservative treatments.    Referral placed for COMPREHENSIVE SPINE PHYSICAL THERAPY to work on core strengthening, lumbar ROM, strengthening, and stretching exercises. and ROBAXIN rx sent to patient's pharmacy. Discussed reasoning for prescribing medication, proper usage, and potential side effects.    Patient instructed to return to office/ER sooner if symptoms are not improving, getting worse, or new worrisome/neurologic symptoms arise.  Patient will follow up in approx. 1 year for re-evaluation after further conservative treatments.       Subjective:      Chief Complaint: Bilateral shoudler Pain    HPI:  Ritu Melo is a 23 y.o. female presenting for initial visit with chief complaint of shoulder tension. Tension in shoulders began a few weeks ago after she started swimming.     Describes discomfort as a tension like feeling.  Located in pollo-scapular muscles.  no numbness . no burning.  Back pain 100%, Leg pain 0%.     Denies any january trauma. Denies fever or chills, no night sweats. Denies any bladder or bowel changes.      Scoliosis surgery performed in the [place] approximately 8 years ago. No post op complications reported.     Conservative therapy includes the following:   Medications: none    Injections: none     Physical Therapy: none recent  Chiropractic Medicine: has not attempted  Accupunture/Massage Therapy: has not attempted   These therapeutic modalities were ineffective at providing sustained pain relief/functional  improvement.     Nicotine dependent: none  Occupation: [employer] employee  Living situation: Lives with family   ADLs: patient is able to perform     Objective:     Family History   Problem Relation Age of Onset    No Known Problems Mother     No Known Problems Father        Past Medical History:   Diagnosis Date    Scoliosis        Current Outpatient Medications   Medication Sig Dispense Refill    norgestimate-ethinyl estradiol (Sprintec 28) 0.25-35 MG-MCG per tablet Take 1 tablet by mouth daily 28 tablet 12     No current facility-administered medications for this visit.       Past Surgical History:   Procedure Laterality Date    SPINE SURGERY      due to scoliosis        Social History     Socioeconomic History    Marital status: /Civil Union     Spouse name: Not on file    Number of children: Not on file    Years of education: Not on file    Highest education level: Not on file   Occupational History    Not on file   Tobacco Use    Smoking status: Never    Smokeless tobacco: Never   Substance and Sexual Activity    Alcohol use: Never    Drug use: Never    Sexual activity: Not Currently     Partners: Male     Comment: Is in B/C pills but needs refills.   Other Topics Concern    Not on file   Social History Narrative    Not on file     Social Determinants of Health     Financial Resource Strain: Low Risk  (2/26/2024)    Overall Financial Resource Strain (CARDIA)     Difficulty of Paying Living Expenses: Not hard at all   Food Insecurity: No Food Insecurity (2/26/2024)    Hunger Vital Sign     Worried About Running Out of Food in the Last Year: Never true     Ran Out of Food in the Last Year: Never true   Transportation Needs: No Transportation Needs (2/26/2024)    PRAPARE - Transportation     Lack of Transportation (Medical): No     Lack of Transportation (Non-Medical): No   Physical Activity: Not on file   Stress: No Stress Concern Present (3/27/2023)    Received from [facility]  "[place] of Occupational Health - Occupational Stress Questionnaire     Feeling of Stress : Only a little   Social Connections: Socially Integrated (3/27/2023)    Received from [facility]    Social Connection and Isolation Panel [NHANES]     Frequency of Communication with Friends and Family: Three times a week     Frequency of Social Gatherings with Friends and Family: Once a week     Attends Hinduism Services: 1 to 4 times per year     Active Member of Clubs or Organizations: No     Attends Club or Organization Meetings: 1 to 4 times per year     Marital Status:    Intimate Partner Violence: Not At Risk (3/27/2023)    Received from [facility]    Humiliation, Afraid, Rape, and Kick questionnaire     Fear of Current or Ex-Partner: No     Emotionally Abused: No     Physically Abused: No     Sexually Abused: No   Housing Stability: Low Risk  (3/27/2023)    Received from [facility]    Housing Stability Vital Sign     Unable to Pay for Housing in the Last Year: No     Number of Places Lived in the Last Year: 1     Unstable Housing in the Last Year: No       No Known Allergies    Review of Systems  General- denies fever/chills  HEENT- denies hearing loss or sore throat  Eyes- denies eye pain or visual disturbances, denies red eyes  Respiratory- denies cough or SOB  Cardio- denies chest pain or palpitations  GI- denies abdominal pain  Endocrine- denies urinary frequency  Urinary- denies pain with urination  Musculoskeletal- Negative except noted above  Skin- denies rashes or wounds  Neurological- denies dizziness or headache  Psychiatric- denies anxiety or difficulty concentrating    Physical Exam  Ht 5' 6\" (1.676 m)   Wt 63.2 kg (139 lb 5.3 oz)   BMI 22.49 kg/m²     General/Constitutional: No apparent distress: well-nourished and well developed.  Lymphatic: No appreciable lymphadenopathy  Respiratory: Non-labored breathing  Vascular: No edema, swelling or " "tenderness, except as noted in detailed exam.  Integumentary: No impressive skin lesions present, except as noted in detailed exam.  Psych: Normal mood and affect, oriented to person, place and time.  MSK: normal other than stated in HPI and exam  Gait & balance: no evidence of myelopathic gait, ambulates Independently     Lumbar spine range of motion:  -Forward flexion to 90  -Extension to neutral  -Lateral bend 25 right, 25 left  -Rotation 25 right, 25 left  There tenderness with palpation along lumbar paraspinal musculature, no midline tenderness     Neurologic:    Lower Extremity Motor Function    Right  Left    Iliopsoas  5/5  5/5    Quadriceps 5/5 5/5   Tibialis anterior  5/5  5/5    EHL  5/5  5/5    Gastroc. muscle  5/5  5/5    Heel rise  5/5  5/5    Toe rise  5/5  5/5      Upper Extremity Motor Function   Right  Left    Deltoid  5/5  5/5    Bicep  5/5  5/5    Wrist extension  5/5  5/5    Tricep  5/5  5/5    Finger flexion/  5/5  5/5    Hand intrinsic  5/5  5/5        Sensory: light touch is intact to bilateral upper and lower extremities     Reflexes:    Right Left   Patellar 1+ 1+   Achilles 1+ 1+   Babinski neg neg     Other tests:  Nur negative  Internal/external hip ROM: intact, no pain   Flexion/extension knee ROM: intact, no pain   Vascular: WWP extremities, 2+DP bilateral      Diagnostic Tests   IMAGING: I have personally reviewed the images and these are my findings:  Scoliosis Series Spine X-rays from 2/26/24: T3-T10 bilateral pedicle screw and bhaskar construct intact without any overt signs of hardware malfunction, overall coronal and sagittal alignment maintained with mild residual lumbar scoliosis    Procedures, if performed today     None performed       Portions of the record may have been created with voice recognition software.  Occasional wrong word or \"sound a like\" substitutions may have occurred due to the inherent limitations of voice recognition software.  Read the chart carefully " and recognize, using context, where substitutions have occurred.

## 2024-02-26 NOTE — PROGRESS NOTES
"Ritu Melo presents today for OCP surveillance visit.  She has been using OCP's since 6/2023 and denies any issues.  She desires to continue with them.    /74   Pulse 80   Ht 5' 6\" (1.676 m)   Wt 63.2 kg (139 lb 6.4 oz)   LMP 02/25/2024 (Exact Date)   BMI 22.50 kg/m²     Return as previously scheduled for annual GYN exam next week.  Rx refills for another year sent to pharmacy.  "

## 2024-02-26 NOTE — PATIENT INSTRUCTIONS
Sailaja por marquis confianza en nuestro equipo.   Le agradecemos y agradecemos tereso comentarios.   Si recibe dwayne encuesta nuestra, tómese unos momentos para informarnos cómo estamos.   Sinceramente,  PEDRO PABLO Rowley

## 2024-03-04 ENCOUNTER — ANNUAL EXAM (OUTPATIENT)
Dept: OBGYN CLINIC | Facility: CLINIC | Age: 24
End: 2024-03-04

## 2024-03-04 VITALS
HEIGHT: 66 IN | SYSTOLIC BLOOD PRESSURE: 108 MMHG | WEIGHT: 140.4 LBS | HEART RATE: 94 BPM | BODY MASS INDEX: 22.56 KG/M2 | DIASTOLIC BLOOD PRESSURE: 70 MMHG

## 2024-03-04 DIAGNOSIS — Z11.3 SCREEN FOR STD (SEXUALLY TRANSMITTED DISEASE): ICD-10-CM

## 2024-03-04 DIAGNOSIS — Z01.419 ENCOUNTER FOR ANNUAL ROUTINE GYNECOLOGICAL EXAMINATION: Primary | ICD-10-CM

## 2024-03-04 PROCEDURE — 87491 CHLMYD TRACH DNA AMP PROBE: CPT | Performed by: NURSE PRACTITIONER

## 2024-03-04 PROCEDURE — 87591 N.GONORRHOEAE DNA AMP PROB: CPT | Performed by: NURSE PRACTITIONER

## 2024-03-04 PROCEDURE — 99395 PREV VISIT EST AGE 18-39: CPT | Performed by: NURSE PRACTITIONER

## 2024-03-04 NOTE — PATIENT INSTRUCTIONS
STD results can take up to 2 weeks  Remember safe sex and condom use  Call with needs or concerns  Return in 1 year

## 2024-03-04 NOTE — PROGRESS NOTES
Annual Exam    Assessment   1. Encounter for annual routine gynecological examination        2. Screen for STD (sexually transmitted disease)  Chlamydia/GC amplified DNA by PCR    RPR-Syphilis Screening (Total Syphilis IGG/IGM)    HIV 1/2 AG/AB W REFLEX [facility] and [facility] only    Hepatitis B surface antigen    Hepatitis C RNA, quantitative, PCR        well woman       Plan       All questions answered.  Breast self exam technique reviewed and patient encouraged to perform self-exam monthly.  Chlamydia specimen.  Contraception: OCP (estrogen/progesterone).  Discussed healthy lifestyle modifications.  Follow up in 1 year.  GC specimen.     Patient Instructions   STD results can take up to 2 weeks  Remember safe sex and condom use  Call with needs or concerns  Return in 1 year  Pt verbalized understanding of all discussed.      Subjective      Ritu Melo is a 23 y.o.  female who presents for annual well woman exam. Periods are regular every 28-30 days, lasting 6 days. No intermenstrual bleeding, spotting, or discharge.  1 partner x 8 years, denies domestic violence  Last WNL PAP was 2022    Depression Screening Follow-up Plan: Patient's depression screening was negative with a PHQ-2 score of 0. Their PHQ-9 score was 0. Clinically patient does not have depression. No treatment is required.      Current contraception: OCP (estrogen/progesterone)  History of abnormal Pap smear: no  Family history of uterine or ovarian cancer: no  Regular self breast exam: yes  History of abnormal mammogram: N/A  Family history of breast cancer: no  History of abnormal lipids: unknown  Menstrual History:  OB History          0    Para   0    Term   0       0    AB   0    Living   0         SAB   0    IAB   0    Ectopic   0    Multiple   0    Live Births   0                Menarche age: 15  Patient's last menstrual period was 2024 (exact date).       The following portions of the patient's history were  "reviewed and updated as appropriate: allergies, current medications, past family history, past medical history, past social history, past surgical history and problem list.    Review of Systems  Pertinent items are noted in HPI.      Objective      /70   Pulse 94   Ht 5' 6\" (1.676 m)   Wt 63.7 kg (140 lb 6.4 oz)   LMP 02/25/2024 (Exact Date)   BMI 22.66 kg/m²     General: alert and oriented, in no acute distress, alert, appears stated age, and cooperative   Heart: NSR   Lungs: clear to auscultation bilaterally, WNL respiratory effort, negative cough or SOB   Thyroid: Negative masses palpable   Abdomen: soft, non-tender, without masses or organomegaly palpable   Vulva: normal   Vagina: normal mucosa   Cervix: no cervical motion tenderness and no lesions   Uterus: normal size, non-tender, normal shape and consistency   Adnexa: normal adnexa   Urethra: normal   Breasts: NT,negative masses palpable, negative discharge, or dimpling             "

## 2024-03-04 NOTE — LETTER
3/6/2024    To Ritu Melo  : 2000      This letter is to advise you that your recent CULTURES for gonorrhea and chlamydia were reviewed by me and are NORMAL.  Please contact the office for an appointment if you have any additional concerns.    PEDRO PABLO Turner

## 2024-03-05 LAB
C TRACH DNA SPEC QL NAA+PROBE: NEGATIVE
N GONORRHOEA DNA SPEC QL NAA+PROBE: NEGATIVE